# Patient Record
Sex: MALE | Race: ASIAN | NOT HISPANIC OR LATINO | URBAN - METROPOLITAN AREA
[De-identification: names, ages, dates, MRNs, and addresses within clinical notes are randomized per-mention and may not be internally consistent; named-entity substitution may affect disease eponyms.]

---

## 2021-02-19 ENCOUNTER — TELEPHONE (OUTPATIENT)
Dept: TRANSPLANT | Facility: CLINIC | Age: 66
End: 2021-02-19
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
## 2021-02-22 DIAGNOSIS — Z00.5 TRANSPLANT DONOR EVALUATION: Primary | ICD-10-CM

## 2021-04-01 ENCOUNTER — ALLIED HEALTH/NURSE VISIT (OUTPATIENT)
Dept: NURSING | Facility: CLINIC | Age: 66
End: 2021-04-01
Payer: COMMERCIAL

## 2021-04-01 VITALS
DIASTOLIC BLOOD PRESSURE: 72 MMHG | WEIGHT: 166 LBS | TEMPERATURE: 97.7 F | SYSTOLIC BLOOD PRESSURE: 106 MMHG | HEART RATE: 59 BPM | BODY MASS INDEX: 25.16 KG/M2 | HEIGHT: 68 IN

## 2021-04-01 DIAGNOSIS — Z00.5 TRANSPLANT DONOR EVALUATION: ICD-10-CM

## 2021-04-01 DIAGNOSIS — Z52.6 LIVER DONOR: Primary | ICD-10-CM

## 2021-04-01 LAB
ABO + RH BLD: NORMAL
ABO + RH BLD: NORMAL
ALBUMIN UR-MCNC: NEGATIVE MG/DL
APPEARANCE UR: CLEAR
BILIRUB UR QL STRIP: NEGATIVE
COLOR UR AUTO: YELLOW
CREAT SERPL-MCNC: 1.02 MG/DL (ref 0.66–1.25)
CREAT UR-MCNC: 78 MG/DL
GFR SERPL CREATININE-BSD FRML MDRD: 77 ML/MIN/{1.73_M2}
GLUCOSE SERPL-MCNC: 96 MG/DL (ref 70–99)
GLUCOSE UR STRIP-MCNC: NEGATIVE MG/DL
HGB BLD-MCNC: 16.3 G/DL (ref 13.3–17.7)
HGB UR QL STRIP: NEGATIVE
KETONES UR STRIP-MCNC: NEGATIVE MG/DL
LEUKOCYTE ESTERASE UR QL STRIP: NEGATIVE
MICROALBUMIN UR-MCNC: <5 MG/L
MICROALBUMIN/CREAT UR: NORMAL MG/G CR (ref 0–17)
NITRATE UR QL: NEGATIVE
NON-SQ EPI CELLS #/AREA URNS LPF: NORMAL /LPF
PH UR STRIP: 7 PH (ref 5–7)
PROT UR-MCNC: 0.11 G/L
PROT/CREAT 24H UR: 0.14 G/G CR (ref 0–0.2)
RBC #/AREA URNS AUTO: NORMAL /HPF
SOURCE: NORMAL
SP GR UR STRIP: 1.02 (ref 1–1.03)
SPECIMEN EXP DATE BLD: NORMAL
UROBILINOGEN UR STRIP-ACNC: 0.2 EU/DL (ref 0.2–1)
WBC #/AREA URNS AUTO: NORMAL /HPF

## 2021-04-01 PROCEDURE — 86900 BLOOD TYPING SEROLOGIC ABO: CPT | Performed by: SURGERY

## 2021-04-01 PROCEDURE — 85018 HEMOGLOBIN: CPT | Performed by: SURGERY

## 2021-04-01 PROCEDURE — 82947 ASSAY GLUCOSE BLOOD QUANT: CPT | Performed by: SURGERY

## 2021-04-01 PROCEDURE — 36415 COLL VENOUS BLD VENIPUNCTURE: CPT | Performed by: SURGERY

## 2021-04-01 PROCEDURE — 99207 PR NO CHARGE NURSE ONLY: CPT

## 2021-04-01 PROCEDURE — 82043 UR ALBUMIN QUANTITATIVE: CPT | Performed by: SURGERY

## 2021-04-01 PROCEDURE — 82565 ASSAY OF CREATININE: CPT | Performed by: SURGERY

## 2021-04-01 PROCEDURE — 84156 ASSAY OF PROTEIN URINE: CPT | Performed by: SURGERY

## 2021-04-01 PROCEDURE — 81001 URINALYSIS AUTO W/SCOPE: CPT | Performed by: SURGERY

## 2021-04-01 RX ORDER — CLOBETASOL PROPIONATE 0.5 MG/G
CREAM TOPICAL
COMMUNITY
Start: 2019-06-03

## 2021-04-01 RX ORDER — ATORVASTATIN CALCIUM 10 MG/1
10 TABLET, FILM COATED ORAL
COMMUNITY
Start: 2021-01-20

## 2021-04-01 ASSESSMENT — MIFFLIN-ST. JEOR: SCORE: 1517.47

## 2021-06-03 DIAGNOSIS — Z00.5 TRANSPLANT DONOR EVALUATION: Primary | ICD-10-CM

## 2021-06-10 DIAGNOSIS — Z00.5 TRANSPLANT DONOR EVALUATION: ICD-10-CM

## 2021-06-21 LAB
A*: NORMAL
A*LOCUS SEROLOGIC EQUIVALENT: 11
A*LOCUS: NORMAL
A*SEROLOGIC EQUIVALENT: 24
AB TEST METHOD: NORMAL
B*: NORMAL
B*LOCUS SEROLOGIC EQUIVALENT: 75
B*LOCUS: NORMAL
B*SEROLOGIC EQUIVALENT: 35
BW-1: NORMAL
C*: NORMAL
C*LOCUS SEROLOGIC EQUIVALENT: 4
C*LOCUS: NORMAL
C*SEROLOGIC EQUIVALENT: 8
DPA1*: NORMAL
DPA1*LOCUS: NORMAL
DPB1*: NORMAL
DPB1*LOCUS: NORMAL
DQA1*: NORMAL
DQA1*LOCUS: NORMAL
DQB1*: NORMAL
DQB1*LOCUS SEROLOGIC EQUIVALENT: 7
DQB1*LOCUS: NORMAL
DQB1*SEROLOGIC EQUIVALENT: 6
DRB1*: NORMAL
DRB1*LOCUS SEROLOGIC EQUIVALENT: 12
DRB1*LOCUS: NORMAL
DRB1*SEROLOGIC EQUIVALENT: 15
DRB3*LOCUS SEROLOGIC EQUIVALENT: 52
DRB3*LOCUS: NORMAL
DRB5*: NORMAL
DRB5*SEROLOGIC EQUIVALENT: 51
DRSSO TEST METHOD: NORMAL

## 2021-10-18 ENCOUNTER — TELEPHONE (OUTPATIENT)
Dept: TRANSPLANT | Facility: CLINIC | Age: 66
End: 2021-10-18

## 2021-10-18 NOTE — TELEPHONE ENCOUNTER
José Miguel had LM asking for next steps. Recip coordinator also said go forward with donors. From [facility] meeting in 9/21 a new PRA sample was needed to compare with donors. Recip coordinator is reasking [facility]'s dialysis center to send a new PRA sample.Unable to speak with José Miguel at this time so left this message with him.

## 2021-11-04 ENCOUNTER — DOCUMENTATION ONLY (OUTPATIENT)
Dept: TRANSPLANT | Facility: CLINIC | Age: 66
End: 2021-11-04

## 2021-11-04 ENCOUNTER — TELEPHONE (OUTPATIENT)
Dept: TRANSPLANT | Facility: CLINIC | Age: 66
End: 2021-11-04

## 2021-11-04 NOTE — TELEPHONE ENCOUNTER
"Reviewed XM at [facility] Meeting with new PRA sample from [facility].Per Dr Robison d/t high antibody levels José Miguel cannot be a direct donor.Now spoke with José Miguel and he understood and is still interested in PEP. Stated he wanted to come forward to be tested.Discussed \"a little\"with his sons and he wants to go forward. Will now sched SHERRIE call.  "

## 2021-11-08 ENCOUNTER — TELEPHONE (OUTPATIENT)
Dept: TRANSPLANT | Facility: CLINIC | Age: 66
End: 2021-11-08
Payer: COMMERCIAL

## 2021-11-08 NOTE — TELEPHONE ENCOUNTER
I left a message for José Miguel.  I asked him to call the main social work number back at [phone] option 3 to reschedule his initial SHERRIE call.    THALIA Montoya, [facility]  Donor  and Independent Living Donor Advocate  [facility], [facility]  Pager:  [phone]  Direct:  [phone] Cell   E-Mail:  [email or web address]

## 2021-11-09 ENCOUNTER — TELEPHONE (OUTPATIENT)
Dept: TRANSPLANT | Facility: CLINIC | Age: 66
End: 2021-11-09

## 2021-11-09 ENCOUNTER — TELEPHONE (OUTPATIENT)
Dept: TRANSPLANT | Facility: CLINIC | Age: 66
End: 2021-11-09
Payer: COMMERCIAL

## 2021-11-10 ENCOUNTER — TELEPHONE (OUTPATIENT)
Dept: TRANSPLANT | Facility: CLINIC | Age: 66
End: 2021-11-10
Payer: COMMERCIAL

## 2021-11-10 NOTE — TELEPHONE ENCOUNTER
Initial Independent Living Donor Advocate contact made with potential donor today.  I introduced myself and my role during the donation process, includin.  SHERRIE ROLE   The federal government requires that all licensed transplant centers provide the living donor with an Independent Living Donor Advocate (SHERRIE).  I do not meet recipients or attend meetings that discuss their care or decision to transplant them. My role is separate to avoid any conflict of interest.  My role is to ensure:  1) your rights are protected;  2) you get all the information you need from the transplant team to make a fully informed decision whether to donate;   3) that living donation is in your best interest.   4) that you have the right to decide NOT to go forward with living donation at any time during this process.  I am available to you throughout the workup, during surgery phase and follow-up at home.   2. WORKUP & PRIVACY     Your identity and workup are not shared with the recipient at any time.     There is a medical donor workup that consists of testing to determine if you are healthy enough to donate.  Workup tests include many blood draws, urine collection/ (kidney function testing), chest x-ray, EKG, CT scan. As you complete each step then you may move on to the next.  Workup can take as little or as long as you need and you can stop the process at any time.     Transplant is a treatment option, not a cure. A kidney from a living kidney donor can last 12-14 years.  Other treatment options are  donation and two types of dialysis.     This is major surgery and your estimated hospital stay is approximately 1-2 nights.  After surgery, there are driving and lifting restrictions - no driving for two weeks and no lifting over ten pounds for 6 - 8 weeks.  Donors are routinely off from work for 4 - 6 weeks after surgery, and potentially longer if they have a physical job.       If you anticipate lost wages due to donation,  donor wage reimbursement options may be available to you and will be reviewed with you during the evaluation process.      The recipient's insurance covers the medical expenses related to the donor evaluation and surgery.  However, it is important for you to carry your own health insurance to address any medical issues that are found and are NOT related to living donation.  3.  QUESTIONS  Have you received a packet from the transplant department?     Questions?    Have you discussed with anyone your potential decision to donate?   Yes.  Is anyone pressuring or coercing you to donate? No.  Have you discussed any financial arrangements with recipient around donating a kidney? No.  Are you aware that you can confidentially opt out at any time, up to and including day of donation? Yes.  At this time, would you like to proceed with the medical evaluation to see if you can be a kidney donor?  Yes.    If yes, the donor coordinator will be reaching out to you with next steps.     You can reach me or someone else on the SHERRIE team by calling [phone] Option 3.    SHERRIE NOTES: José Miguel is interested in advanced donation where his wife would receive her transplant first and then he would donate once she has recovered.  2 p.m. on 11/23 is his donor nurse coodinator call with Jaelyn East RN    Duration of call 35

## 2021-11-18 ENCOUNTER — TELEPHONE (OUTPATIENT)
Dept: TRANSPLANT | Facility: CLINIC | Age: 66
End: 2021-11-18
Payer: COMMERCIAL

## 2021-11-18 NOTE — TELEPHONE ENCOUNTER
Contacted Dennis Gonzales to introduce myself and my role, review of medical/surgical/family history and next steps.     Dennis Gonzales  is aware He can stop donor evaluation at any time.    Have you ever been positive for COVID 19? no    Have you received the COVID vaccination? yes If yes, when and what brand? Moderna     Dennis Gonzales is a 66 year old male  ABO O that would like to learn more about donation to his wife.     Concerns from medical/surgical/family history: none currently. Family history of HTN and Diabetes in both parents.    Reviewed any history of travel in endemic areas:   Strongyloides- [place], [place] and [place].  Trypanosoma cruzi ([place])- [place]  West Nile Virus- [place], [place], [place], [place] and [place].     Per our Phase 1 algorithm, does not meet criteria to do preliminary testing.    Reviewed preliminary lab tests.     Reviewed evaluation testing: Covid PCR, Iohexol, Lab work, CXR, EKG, Provider visits and functions, CT Angiogram.     Reviewed operations of selection committee and angio review meetings and the need for multidisciplinary input.     Reviewed NKR listing and transfer of care to [facility] team if approved. Provided Dennis with NKR website to review.     Briefly went over options if approved of NDD, SVP and FVP.     Dennis would like to proceed to evaluation on 12/9/21 if possible with Iohexol on 12/9/21 and COVID PCR prior to Iohexol.     Confirmed that Dennis reviewed Informed consent document and all questions answered.  Reviewed that they will receive Docusign to obtain electronic signature for the following: Informed consent, SRTR data, MARCUS for medical information, Auth for Electronic communication and will need their signed consent back before proceeding with evaluation.      Encouraged sign up for MyChart and confirmed [facility] sign up.    Verified recipient status if not NDD.    Donor timeline: TBD     Will send orders to scheduling team to  set up for evaluation testing.

## 2021-11-19 DIAGNOSIS — Z00.5 TRANSPLANT DONOR EVALUATION: Primary | ICD-10-CM

## 2021-11-19 RX ORDER — METHYLPREDNISOLONE SODIUM SUCCINATE 125 MG/2ML
125 INJECTION, POWDER, LYOPHILIZED, FOR SOLUTION INTRAMUSCULAR; INTRAVENOUS
Status: CANCELLED
Start: 2021-12-09

## 2021-11-19 RX ORDER — NALOXONE HYDROCHLORIDE 0.4 MG/ML
0.2 INJECTION, SOLUTION INTRAMUSCULAR; INTRAVENOUS; SUBCUTANEOUS
Status: CANCELLED | OUTPATIENT
Start: 2021-12-09

## 2021-11-19 RX ORDER — ALBUTEROL SULFATE 0.83 MG/ML
2.5 SOLUTION RESPIRATORY (INHALATION)
Status: CANCELLED | OUTPATIENT
Start: 2021-12-09

## 2021-11-19 RX ORDER — EPINEPHRINE 1 MG/ML
0.3 INJECTION, SOLUTION, CONCENTRATE INTRAVENOUS EVERY 5 MIN PRN
Status: CANCELLED | OUTPATIENT
Start: 2021-12-09

## 2021-11-19 RX ORDER — MEPERIDINE HYDROCHLORIDE 25 MG/ML
25 INJECTION INTRAMUSCULAR; INTRAVENOUS; SUBCUTANEOUS EVERY 30 MIN PRN
Status: CANCELLED | OUTPATIENT
Start: 2021-12-09

## 2021-11-19 RX ORDER — ALBUTEROL SULFATE 90 UG/1
1-2 AEROSOL, METERED RESPIRATORY (INHALATION)
Status: CANCELLED
Start: 2021-12-09

## 2021-11-19 RX ORDER — DIPHENHYDRAMINE HYDROCHLORIDE 50 MG/ML
50 INJECTION INTRAMUSCULAR; INTRAVENOUS
Status: CANCELLED
Start: 2021-12-09

## 2021-12-05 ENCOUNTER — HEALTH MAINTENANCE LETTER (OUTPATIENT)
Age: 66
End: 2021-12-05

## 2021-12-09 ENCOUNTER — OFFICE VISIT (OUTPATIENT)
Dept: TRANSPLANT | Facility: CLINIC | Age: 66
End: 2021-12-09

## 2021-12-09 ENCOUNTER — OFFICE VISIT (OUTPATIENT)
Dept: TRANSPLANT | Facility: CLINIC | Age: 66
End: 2021-12-09
Attending: SURGERY

## 2021-12-09 ENCOUNTER — OFFICE VISIT (OUTPATIENT)
Dept: INFUSION THERAPY | Facility: CLINIC | Age: 66
End: 2021-12-09
Attending: INTERNAL MEDICINE
Payer: COMMERCIAL

## 2021-12-09 ENCOUNTER — ALLIED HEALTH/NURSE VISIT (OUTPATIENT)
Dept: TRANSPLANT | Facility: CLINIC | Age: 66
End: 2021-12-09

## 2021-12-09 ENCOUNTER — LAB (OUTPATIENT)
Dept: LAB | Facility: CLINIC | Age: 66
End: 2021-12-09
Attending: INTERNAL MEDICINE

## 2021-12-09 ENCOUNTER — ANCILLARY PROCEDURE (OUTPATIENT)
Dept: GENERAL RADIOLOGY | Facility: CLINIC | Age: 66
End: 2021-12-09
Attending: INTERNAL MEDICINE
Payer: COMMERCIAL

## 2021-12-09 ENCOUNTER — APPOINTMENT (OUTPATIENT)
Dept: TRANSPLANT | Facility: CLINIC | Age: 66
End: 2021-12-09
Attending: TRANSPLANT SURGERY

## 2021-12-09 ENCOUNTER — ALLIED HEALTH/NURSE VISIT (OUTPATIENT)
Dept: TRANSPLANT | Facility: CLINIC | Age: 66
End: 2021-12-09
Attending: TRANSPLANT SURGERY

## 2021-12-09 ENCOUNTER — ANCILLARY PROCEDURE (OUTPATIENT)
Dept: CT IMAGING | Facility: CLINIC | Age: 66
End: 2021-12-09
Attending: INTERNAL MEDICINE
Payer: COMMERCIAL

## 2021-12-09 VITALS
DIASTOLIC BLOOD PRESSURE: 75 MMHG | SYSTOLIC BLOOD PRESSURE: 114 MMHG | OXYGEN SATURATION: 97 % | BODY MASS INDEX: 25.53 KG/M2 | WEIGHT: 167.9 LBS | RESPIRATION RATE: 16 BRPM | TEMPERATURE: 97.9 F | HEART RATE: 62 BPM

## 2021-12-09 VITALS — HEIGHT: 68 IN | HEART RATE: 65 BPM | BODY MASS INDEX: 25.31 KG/M2 | OXYGEN SATURATION: 99 % | WEIGHT: 167 LBS

## 2021-12-09 DIAGNOSIS — Z00.5 TRANSPLANT DONOR EVALUATION: ICD-10-CM

## 2021-12-09 DIAGNOSIS — Z00.5 TRANSPLANT DONOR EVALUATION: Primary | ICD-10-CM

## 2021-12-09 LAB
ABO/RH(D): NORMAL
ALBUMIN SERPL-MCNC: 4.2 G/DL (ref 3.4–5)
ALBUMIN UR-MCNC: NEGATIVE MG/DL
ALP SERPL-CCNC: 91 U/L (ref 40–150)
ALT SERPL W P-5'-P-CCNC: 39 U/L (ref 0–70)
ANION GAP SERPL CALCULATED.3IONS-SCNC: 5 MMOL/L (ref 3–14)
ANTIBODY SCREEN: NEGATIVE
APPEARANCE UR: CLEAR
APTT PPP: 32 SECONDS (ref 22–38)
AST SERPL W P-5'-P-CCNC: 26 U/L (ref 0–45)
ATRIAL RATE - MUSE: 62 BPM
BILIRUB SERPL-MCNC: 1.6 MG/DL (ref 0.2–1.3)
BILIRUB UR QL STRIP: NEGATIVE
BUN SERPL-MCNC: 20 MG/DL (ref 7–30)
CALCIUM SERPL-MCNC: 9.2 MG/DL (ref 8.5–10.1)
CHLORIDE BLD-SCNC: 106 MMOL/L (ref 94–109)
CHOLEST SERPL-MCNC: 179 MG/DL
CMV IGG SERPL IA-ACNC: 4.1 U/ML
CMV IGG SERPL IA-ACNC: ABNORMAL
CO2 SERPL-SCNC: 29 MMOL/L (ref 20–32)
COLOR UR AUTO: YELLOW
CREAT SERPL-MCNC: 1.01 MG/DL (ref 0.66–1.25)
CREAT UR-MCNC: 132 MG/DL
CREAT UR-MCNC: 132 MG/DL
DIASTOLIC BLOOD PRESSURE - MUSE: NORMAL MMHG
EBV VCA IGG SER IA-ACNC: 21.1 U/ML
EBV VCA IGG SER IA-ACNC: ABNORMAL
EBV VCA IGM SER IA-ACNC: <10 U/ML
EBV VCA IGM SER IA-ACNC: NORMAL
ERYTHROCYTE [DISTWIDTH] IN BLOOD BY AUTOMATED COUNT: 12.3 % (ref 10–15)
FASTING STATUS PATIENT QL REPORTED: YES
GFR SERPL CREATININE-BSD FRML MDRD: 78 ML/MIN/1.73M2
GLUCOSE BLD-MCNC: 93 MG/DL (ref 70–99)
GLUCOSE UR STRIP-MCNC: NEGATIVE MG/DL
HBA1C MFR BLD: 5.5 % (ref 0–5.6)
HBV CORE AB SERPL QL IA: NONREACTIVE
HBV SURFACE AB SERPL IA-ACNC: 371.8 M[IU]/ML
HBV SURFACE AG SERPL QL IA: NONREACTIVE
HCT VFR BLD AUTO: 50.3 % (ref 40–53)
HCV AB SERPL QL IA: NONREACTIVE
HDLC SERPL-MCNC: 72 MG/DL
HGB BLD-MCNC: 17.1 G/DL (ref 13.3–17.7)
HGB UR QL STRIP: NEGATIVE
HIV 1+2 AB+HIV1 P24 AG SERPL QL IA: NONREACTIVE
INR PPP: 0.96 (ref 0.85–1.15)
INTERPRETATION ECG - MUSE: NORMAL
KETONES UR STRIP-MCNC: NEGATIVE MG/DL
LDLC SERPL CALC-MCNC: 83 MG/DL
LEUKOCYTE ESTERASE UR QL STRIP: NEGATIVE
MCH RBC QN AUTO: 30.3 PG (ref 26.5–33)
MCHC RBC AUTO-ENTMCNC: 34 G/DL (ref 31.5–36.5)
MCV RBC AUTO: 89 FL (ref 78–100)
MICROALBUMIN UR-MCNC: 5 MG/L
MICROALBUMIN/CREAT UR: 3.79 MG/G CR (ref 0–17)
MUCOUS THREADS #/AREA URNS LPF: PRESENT /LPF
NITRATE UR QL: NEGATIVE
NONHDLC SERPL-MCNC: 107 MG/DL
P AXIS - MUSE: 75 DEGREES
PH UR STRIP: 6 [PH] (ref 5–7)
PHOSPHATE SERPL-MCNC: 3.3 MG/DL (ref 2.5–4.5)
PLATELET # BLD AUTO: 188 10E3/UL (ref 150–450)
POTASSIUM BLD-SCNC: 3.9 MMOL/L (ref 3.4–5.3)
PR INTERVAL - MUSE: 162 MS
PROT SERPL-MCNC: 8.3 G/DL (ref 6.8–8.8)
PROT UR-MCNC: 0.08 G/L
PROT/CREAT 24H UR: 0.06 G/G CR (ref 0–0.2)
PSA SERPL-MCNC: 0.75 UG/L (ref 0–4)
QRS DURATION - MUSE: 90 MS
QT - MUSE: 424 MS
QTC - MUSE: 430 MS
R AXIS - MUSE: 64 DEGREES
RBC # BLD AUTO: 5.64 10E6/UL (ref 4.4–5.9)
RBC URINE: <1 /HPF
SODIUM SERPL-SCNC: 140 MMOL/L (ref 133–144)
SP GR UR STRIP: 1.01 (ref 1–1.03)
SPECIMEN EXPIRATION DATE: NORMAL
SYSTOLIC BLOOD PRESSURE - MUSE: NORMAL MMHG
T AXIS - MUSE: 47 DEGREES
T PALLIDUM AB SER QL: NONREACTIVE
TRIGL SERPL-MCNC: 118 MG/DL
URATE SERPL-MCNC: 6.4 MG/DL (ref 3.5–7.2)
UROBILINOGEN UR STRIP-MCNC: NORMAL MG/DL
VENTRICULAR RATE- MUSE: 62 BPM
WBC # BLD AUTO: 6.5 10E3/UL (ref 4–11)
WBC URINE: <1 /HPF

## 2021-12-09 PROCEDURE — 86644 CMV ANTIBODY: CPT

## 2021-12-09 PROCEDURE — 84100 ASSAY OF PHOSPHORUS: CPT

## 2021-12-09 PROCEDURE — 71046 X-RAY EXAM CHEST 2 VIEWS: CPT | Mod: GC | Performed by: RADIOLOGY

## 2021-12-09 PROCEDURE — 36415 COLL VENOUS BLD VENIPUNCTURE: CPT | Performed by: SURGERY

## 2021-12-09 PROCEDURE — 85027 COMPLETE CBC AUTOMATED: CPT

## 2021-12-09 PROCEDURE — 85730 THROMBOPLASTIN TIME PARTIAL: CPT

## 2021-12-09 PROCEDURE — 86753 PROTOZOA ANTIBODY NOS: CPT

## 2021-12-09 PROCEDURE — 82043 UR ALBUMIN QUANTITATIVE: CPT

## 2021-12-09 PROCEDURE — 86706 HEP B SURFACE ANTIBODY: CPT

## 2021-12-09 PROCEDURE — 84550 ASSAY OF BLOOD/URIC ACID: CPT

## 2021-12-09 PROCEDURE — 86900 BLOOD TYPING SEROLOGIC ABO: CPT

## 2021-12-09 PROCEDURE — 99203 OFFICE O/P NEW LOW 30 MIN: CPT | Performed by: SURGERY

## 2021-12-09 PROCEDURE — 250N000011 HC RX IP 250 OP 636: Performed by: INTERNAL MEDICINE

## 2021-12-09 PROCEDURE — 36415 COLL VENOUS BLD VENIPUNCTURE: CPT

## 2021-12-09 PROCEDURE — 82542 COL CHROMOTOGRAPHY QUAL/QUAN: CPT | Performed by: SURGERY

## 2021-12-09 PROCEDURE — G0103 PSA SCREENING: HCPCS

## 2021-12-09 PROCEDURE — 80053 COMPREHEN METABOLIC PANEL: CPT

## 2021-12-09 PROCEDURE — 86682 HELMINTH ANTIBODY: CPT

## 2021-12-09 PROCEDURE — 74175 CTA ABDOMEN W/CONTRAST: CPT | Mod: GC | Performed by: RADIOLOGY

## 2021-12-09 PROCEDURE — 86780 TREPONEMA PALLIDUM: CPT

## 2021-12-09 PROCEDURE — 86704 HEP B CORE ANTIBODY TOTAL: CPT

## 2021-12-09 PROCEDURE — 85610 PROTHROMBIN TIME: CPT

## 2021-12-09 PROCEDURE — 81001 URINALYSIS AUTO W/SCOPE: CPT

## 2021-12-09 PROCEDURE — 87340 HEPATITIS B SURFACE AG IA: CPT

## 2021-12-09 PROCEDURE — 86803 HEPATITIS C AB TEST: CPT

## 2021-12-09 PROCEDURE — 84156 ASSAY OF PROTEIN URINE: CPT

## 2021-12-09 PROCEDURE — 86481 TB AG RESPONSE T-CELL SUSP: CPT

## 2021-12-09 PROCEDURE — 86665 EPSTEIN-BARR CAPSID VCA: CPT

## 2021-12-09 PROCEDURE — 86789 WEST NILE VIRUS ANTIBODY: CPT

## 2021-12-09 PROCEDURE — 83036 HEMOGLOBIN GLYCOSYLATED A1C: CPT

## 2021-12-09 PROCEDURE — 80061 LIPID PANEL: CPT

## 2021-12-09 RX ORDER — ALBUTEROL SULFATE 0.83 MG/ML
2.5 SOLUTION RESPIRATORY (INHALATION)
Status: CANCELLED | OUTPATIENT
Start: 2021-12-09

## 2021-12-09 RX ORDER — MEPERIDINE HYDROCHLORIDE 25 MG/ML
25 INJECTION INTRAMUSCULAR; INTRAVENOUS; SUBCUTANEOUS EVERY 30 MIN PRN
Status: CANCELLED | OUTPATIENT
Start: 2021-12-09

## 2021-12-09 RX ORDER — EPINEPHRINE 1 MG/ML
0.3 INJECTION, SOLUTION INTRAMUSCULAR; SUBCUTANEOUS EVERY 5 MIN PRN
Status: CANCELLED | OUTPATIENT
Start: 2021-12-09

## 2021-12-09 RX ORDER — NALOXONE HYDROCHLORIDE 0.4 MG/ML
0.2 INJECTION, SOLUTION INTRAMUSCULAR; INTRAVENOUS; SUBCUTANEOUS
Status: CANCELLED | OUTPATIENT
Start: 2021-12-09

## 2021-12-09 RX ORDER — DIPHENHYDRAMINE HYDROCHLORIDE 50 MG/ML
50 INJECTION INTRAMUSCULAR; INTRAVENOUS
Status: CANCELLED
Start: 2021-12-09

## 2021-12-09 RX ORDER — ALBUTEROL SULFATE 90 UG/1
1-2 AEROSOL, METERED RESPIRATORY (INHALATION)
Status: CANCELLED
Start: 2021-12-09

## 2021-12-09 RX ORDER — METHYLPREDNISOLONE SODIUM SUCCINATE 125 MG/2ML
125 INJECTION, POWDER, LYOPHILIZED, FOR SOLUTION INTRAMUSCULAR; INTRAVENOUS
Status: CANCELLED
Start: 2021-12-09

## 2021-12-09 RX ORDER — IOPAMIDOL 755 MG/ML
100 INJECTION, SOLUTION INTRAVASCULAR ONCE
Status: COMPLETED | OUTPATIENT
Start: 2021-12-09 | End: 2021-12-09

## 2021-12-09 RX ADMIN — IOPAMIDOL 100 ML: 755 INJECTION, SOLUTION INTRAVASCULAR at 13:44

## 2021-12-09 RX ADMIN — IOHEXOL 5 ML: 300 INJECTION, SOLUTION INTRAVENOUS at 09:08

## 2021-12-09 ASSESSMENT — MIFFLIN-ST. JEOR: SCORE: 1517.01

## 2021-12-09 ASSESSMENT — PAIN SCALES - GENERAL: PAINLEVEL: NO PAIN (0)

## 2021-12-09 NOTE — PROGRESS NOTES
Living Kidney Donor Consent per OPTN Policy 14.3.A for Independent Living Donor Advocate (SHERRIE)    Organ Type: Unrelated Kidney Donor (spouse)    Presenting Information:  Dennis Gonzales presents to [facility] to complete a living donor evaluation since angel is interested in becoming a kidney donor for his wife, Fatemeh Gonzales.  Mr. GONZALES is a 66 year old  American male who was neatly dressed and groomed.  His mood was euthymic.  Thought process logical and goal directed.  He came to the transplant center alone today.  He was pleasant and forthcoming with sharing information.    Written assurance has been obtained from the potential donor that he/she:   Is willing to donate  Is free from inducement and coercion  Has been informed that the he/she may decline to donate at any time  Has been informed that transplant centers must:   A) Offer donors an opportunity to discontinue the donor consent or evaluation process in a way that is protected and confidential  B) Provide an independent living donor advocate (SHERRIE) to assist the potential donor during this process    The following was presented to the potential donor in a language in which the potential donor is able to engage in meaningful dialogue:   Education and instruction about all phases of the living donation process including:   Consent  Medical and psychosocial evaluation  Information about the surgical procedure  Pre and post operative care  Benefits of post operative follow up  Disclosure that the recovery hospital will take all reasonable precautions to provide confidentiality for the donor/recipient  Disclosure that it is a federal crime for any person to knowingly acquire, obtain or otherwise transfer any human organ for valuable consideration  Disclosure that the recovery hospital must provide an independent living donor advocate (SHERRIE)  Disclosure that health information obtained  during the evaluation is subject to the same regulations as all records and could reveal conditions that must be reported to local, state, or federal public health authorities  Disclosure that the [facility] is required to report living donor follow up information at 6 months, 1 year, and 2 years, and that the potential donor must commit to post operative follow up testing coordinated by the [facility]    Disclosure has been provided that these risks may be transient or permanent & include but are not limited to:  Potential psychosocial risks:  Problems with body image  Post-surgery depression or anxiety  Feelings of emotional distress or bereavement if recipient experiences any recurrent disease or in the event of the recipient s death  Impact of donation on the donor s lifestyle, such as limited ability to exercise in the short term post operative recovery period, no driving for the first 2 weeks post op or until the donor is no longer needing pain medications that impair the ability to drive.      Potential financial impacts:  Personal expenses of travel, housing, , lost wages related to donation might not be reimbursed. However, resources may be available to defray some donation-related expenses.   Need for life-long follow up at the donor s expense  Loss of employment or income  Negative impact on the ability to obtain future employment  Negative impact on the ability to obtain, maintain, or afford health, disability, and life insurance  Future health problems experienced by living donors following donation may not be covered by the recipient s insurance      PREPARATION FOR DONATION, RECOVERY, AND POTENTIAL SHORT-LONG-TERM OUTCOMES:  Understanding of the Living Donation Process:  We discussed the role of Independent Living Donor Advocate.  Short and long term medical and psychosocial risks to both, donor and recipient were reviewed and he is capable of understanding the risks.  Post  surgical restrictions (2 weeks no driving, 6 weeks no lifting over 10 lbs) were reviewed and he appears capable of adhering to the post surgical requirements. The need for a caregiver was discussed and he was strongly encouraged to ask for help with caregiving.  He has two sons who live out of state and are successful and busy.  I encouraged him to ask for their help post donation .  The risk of poor psychosocial outcome including problems with body image, post-surgery depression or anxiety, or feelings of emotional distress or bereavement if recipient experiences any recurrent disease, poor outcome or death was reviewed.  Additionally, potential financial implications, including the risk of having difficulty obtaining health care insurance, life insurance, disability insurance, or long term care insurance were reviewed, as were available donor grants to assist with donor related expenses.      We also discussed some unique issues that arise with paired kidney donation, which include the uncertainty of the timing and the importance of having a employment situation and support system that is able to provide sustained support and flexibility.    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  He does push up every morning and runs, to stay healthy.  He speaks highly of his sons who live out of state.  I did encourage him to seek out their support at time of donation.    He appears capable of understanding this information and making an informed medical decision.    Impressions/Recommendations:  Mr. Gonzales appears highly motivated to donate a kidney to his wife, Fatemeh GONZALES.  He denies pressure, inducement or coercion.  He is aware that he can chose to decline donation at any time.  He is retired and has a hard time identifying a support system, but does state his sons are supportive.  As the SHERRIE, I see no contraindication to donation.  If contraindications are identified, those will be considered and  followed up on.  He has my contact information and knows that I am available though all phases of the donation process.    Contact Information:  THALIA TURCIOS, [facility]   Independent Donor Advocate  [facility]  Phone - [phone]  Pager - [phone]  [email or web address]      Time Spent: 30 minutes

## 2021-12-09 NOTE — LETTER
2021         RE: Dennis Gonzales   [place] [facility] [id number]        Dear Colleague,    Thank you for referring your patient, Dennis Gonzales, to the [facility]. Please see a copy of my visit note below.    Donor Iohexol test    Dennis Gonzales presents today to [facility] for a Donor Iohexol test.      Progress note:  ID verified by name and .     The following information was verified with the patient:  Female Patients is there any possibility of being pregnant n/a  Is there a history of allergy (skin rash, swelling, ect) to:   A.  Iodine (except skin reactions to betadine): No   B. Intravenous radio-contrast agents: No   C. Seafood No     present during visit today: Not Applicable.    R.N. provided patient with educational handout regarding timed test. Yes    24 g piv placed in left ac and Iohexol administered over 2 minutes.  Positive blood return verified before and after injection. piv removed.  20 gauge PIV placed in left arm by lab  for blood draws and CT this afternoon.    Medication administered:  Iohexol (Omnipaque 300mg iodine/ml concentration) 5 mls.    Start time: 908  Stop time: 910    Drug Waste Record    Drug Name: iohexol  Dose: 5ml  Route administered: IV  NDC #: 0407-1413-10  Amount of waste(mL):5ml  Reason for waste: Single use vial    Administrations This Visit     iohexol (OMNIPAQUE 300) injection 5 mL     Admin Date  2021 Action  Given Dose  5 mL Route  Intravenous Administered By  Steffi Pool RN                    Evaluation nurse in transplant to draw labs at 2 and 4 hours post iohexol administration.  Patient given a slip with the times to get labs drawn and verbalized understanding of the plan.    Patient tolerated the procedure:  Yes    After the infusion patient was discharged to the next appointment.    Steffi Pool RN      [facility]

## 2021-12-09 NOTE — PROGRESS NOTES
67 yo M interested in donation to wife- paired exchange due to antibodies. Understands and wants to proceed with paired exchange if approved. 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 Active as a runner, runs 5K daily and also walks and does push ups every morning. Uses elliptical during bad weather.     Medical history: prediabetes some years ago, but controlled with diet and exercise. Fatty liver (remote), hasn't drank alcohol for several years    Past head trauma from an accident (very remote). Has a slight left facial droop from this but no other sequelae.     Does have some neck pain- saw physical therapist for this. Not using medication or injection, but has some pain with turning his neck in certain ways. Counseled about neck pain after positioning.     abd soft, flat, no hernia.     Transplant Surgery Consult Note    Medical record number: [id number]  YOB: 1955,   Consult requested by the patient for evaluation of kidney donation candidacy.    Assessment and Recommendations: Mr. Gonzales appears to be a good candidate for kidney donation at this point in the evaluation. The following issues will need to be addressed prior to formal review:    Prediabetes: reportedly diagnosed some years ago, but current A1c 5.5 with diet and increased exercise. Counseled to continue healthy habits.    Elevated total bilirubin/history of fatty liver disease: no evidence of portal HTN or synthetic dysfunction. Will eval liver appearance on CTA. If concerned, can proceed with RUQ US and hepatology consult.       The majority of our visit today was spent in counseling regarding the medical and surgical risks of kidney donation, the typical timur-and post-operative experience and recovery/return to work pattern.  We also talked about post-op visits and longer term  health care maintenance, as well as the implications of having one remaining kidney. This discussion included, but was not limited to rates of complications such as bleeding, infection, need for transfusion, reoperation, other organ injury, future bowel obstruction, incisional hernia, port site pain, varicocele, venous thrombosis, pulmonary embolism, renal failure, and death (3 per 10,000). The patient understands that if end stage renal failure happens that dialysis or transplant would be required. At the conclusion of the visit, all questions had been answered and Mr. Gonzales's candidacy for donation will be reviewed at our Multidisciplinary Donor Selection Committee. He will stay in contact with the nurse coordinator with any concerns.      Total time: 35 minutes  Counselling time: 30 minutes      Mack Dsouza MD  ---------------------------------------------------------------------------------------------------    HPI: Dennis Gonzales is a 66 year old year old male who presents for a kidney donor evaluation.  Patient would like to donate to his wife. They are not compatible for direct donation due to unacceptable antigens and will need to proceed with paired exchange. She is doing well on dialysis twice weekly, and has been able to continue working from home.     He is also in good health. His only medication is a low dose statin. 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 He is active with running and walking 5K a few times per week and using the elliptical.     He does have some neck pain with turning his head for which he has seen a physical therapist. We discussed positioning and the potential strain on his neck, and he is comfortable proceeding.     Personal history of:   No    Yes  Cancer:    [x]      []             Comment:     Diabetes   []      [x]  Comment:  Reported prediabetes but improved to A1c 5.5 with diet and exercise  Brittnee   [x]      []  Comment:        Hepatitis   [x]      []  Comment:    Tuberculosis   [x]      []  Comment:   Back or neck pain:  []      [x]  Comment:  As above   Kidney stones   [x]      []  Comment:                  Kidney infections  [x]      []  Comment:           Urinary retention  [x]      []            Comment:   Regular NSAID use:  [x]      []            Comment:      Constipation:   [x]      []            Comment:      Moravian  [x]      []            Comment:      Other:    [x]      []            Comment:         Past Medical History:   Diagnosis Date     Gilbert's syndrome      High cholesterol      No past surgical history on file.  Family History   Problem Relation Age of Onset     Diabetes Mother      Hypertension Mother      Diabetes Father      Alzheimer Disease Father      Unknown/Adopted Maternal Grandmother      Unknown/Adopted Maternal Grandfather      Unknown/Adopted Paternal Grandmother      Unknown/Adopted Paternal Grandfather      Food Allergy Son      No Known Problems Son      Social History     Socioeconomic History     Marital status:      Spouse name: Not on file     Number of children: Not on file     Years of education: Not on file     Highest education level: Not on file   Occupational History     Not on file   Tobacco Use     Smoking status: Never Smoker     Smokeless tobacco: Never Used   Substance and Sexual Activity     Alcohol use: Not Currently     Drug use: Never     Sexual activity: Yes     Partners: Female   Other Topics Concern     Not on file   Social History Narrative     Not on file     Social Determinants of Health     Financial Resource Strain: Not on file   Food Insecurity: Not on file   Transportation Needs: Not on file   Physical Activity: Not on file   Stress: Not on file   Social Connections: Not on file   Intimate Partner Violence: Not on file   Housing Stability: Not on file       ROS:   CONSTITUTIONAL:  No fevers or chills  EYES: negative for icterus  ENT:  negative for  hearing loss, tinnitus and sore throat  RESPIRATORY:  negative for cough, sputum, dyspnea  CARDIOVASCULAR:  negative for chest pain  GASTROINTESTINAL:  negative for nausea, vomiting, diarrhea or constipation  GENITOURINARY:  negative for incontinence, dysuria, bladder emptying problems  HEME:  No easy bruising  INTEGUMENT:  negative for rash and pruritus  NEURO:  Negative for headache, seizure disorder    Allergies:   No Known Allergies    Medications:  Prescription Medications as of 12/9/2021       Rx Number Disp Refills Start End Last Dispensed Date Next Fill Date Owning Pharmacy    atorvastatin (LIPITOR) 10 MG tablet    1/20/2021        Sig: Take 10 mg by mouth    Class: Historical    Route: Oral    calcium-vitamin D (OSCAL) 250-125 MG-UNIT per tablet            Class: Historical    clobetasol (TEMOVATE) 0.05 % external cream    6/3/2019        Class: Historical    Route: Topical      Clinic-Administered Medications as of 12/9/2021       Dose Frequency Start End    CT Sodium Chloride (Completed) 100 mL ONCE 12/9/2021 12/9/2021    Admin Instructions: This entry is for use by Radiology to intermittently used as a flush in patients receiving a CT scan.    Route: Intravenous    iohexol (OMNIPAQUE 300) injection 5 mL (Completed) 5 mL ONCE 12/9/2021 12/9/2021    Route: Intravenous    iopamidol (ISOVUE-370) solution 100 mL (Completed) 100 mL ONCE 12/9/2021 12/9/2021    Route: Intravenous          Exam:   Temp:  [97.9  F (36.6  C)] 97.9  F (36.6  C)  Pulse:  [62-65] 65  Resp:  [16] 16  BP: (114)/(75) 114/75  SpO2:  [97 %-99 %] 99 %  Body mass index is 25.39 kg/m .  Temp:  [97.9  F (36.6  C)] 97.9  F (36.6  C)  Pulse:  [62-65] 65  Resp:  [16] 16  BP: (114)/(75) 114/75  SpO2:  [97 %-99 %] 99 %  Appearance: in no apparent distress.   Skin: normal, warm, dry  Head and Neck: Normal, no rashes or jaundice  Respiratory: normal respiratory excursions, no audible wheeze  Cardiovascular: RRR  Abdomen: soft, nontender,  nondistended. No hernia. No incisions  Extremeties: Edema, none  Neuro: without deficit, cranial nerves intact. Does have slight left facial droop at lip.        Diagnostics:   Recent Results (from the past 336 hour(s))   EBV Capsid Antibody IgM    Collection Time: 12/09/21  8:42 AM   Result Value Ref Range    EBV Capsid Nadia IgM Instrument Value <10.0 <36.0 U/mL    EBV Capsid Antibody IgM No detectable antibody. No detectable antibody.   EBV Capsid Antibody IgG    Collection Time: 12/09/21  8:42 AM   Result Value Ref Range    EBV Capsid Nadia IgG Instrument Value 21.1 (H) <18.0 U/mL    EBV Capsid Antibody IgG Equivocal (A) No detectable antibody.   CMV Antibody IgG    Collection Time: 12/09/21  8:42 AM   Result Value Ref Range    CMV Nadia IgG Instrument Value 4.10 (H) <0.60 U/mL    CMV Antibody IgG Positive, suggests recent or past exposure. (A) No detectable antibody.    Prostate spec antigen screen  for men over 50    Collection Time: 12/09/21  8:42 AM   Result Value Ref Range    Prostate Specific Antigen Screen 0.75 0.00 - 4.00 ug/L   Treponema Abs w Reflex to RPR and Titer    Collection Time: 12/09/21  8:42 AM   Result Value Ref Range    Treponema Antibody Total Nonreactive Nonreactive   HIV Antigen Antibody Combo Pretransplant    Collection Time: 12/09/21  8:42 AM   Result Value Ref Range    HIV Antigen Antibody Combo Pretransplant Nonreactive Nonreactive   Hepatitis C antibody    Collection Time: 12/09/21  8:42 AM   Result Value Ref Range    Hepatitis C Antibody Nonreactive Nonreactive   Hepatitis B surface antigen    Collection Time: 12/09/21  8:42 AM   Result Value Ref Range    Hepatitis B Surface Antigen Nonreactive Nonreactive   Hepatitis B Surface Antibody    Collection Time: 12/09/21  8:42 AM   Result Value Ref Range    Hepatitis B Surface Antibody 371.80 (H) <8.00 m[IU]/mL   Hepatitis B core antibody    Collection Time: 12/09/21  8:42 AM   Result Value Ref Range    Hepatitis B Core Antibody Total  Nonreactive Nonreactive   CBC with platelets    Collection Time: 12/09/21  8:42 AM   Result Value Ref Range    WBC Count 6.5 4.0 - 11.0 10e3/uL    RBC Count 5.64 4.40 - 5.90 10e6/uL    Hemoglobin 17.1 13.3 - 17.7 g/dL    Hematocrit 50.3 40.0 - 53.0 %    MCV 89 78 - 100 fL    MCH 30.3 26.5 - 33.0 pg    MCHC 34.0 31.5 - 36.5 g/dL    RDW 12.3 10.0 - 15.0 %    Platelet Count 188 150 - 450 10e3/uL   Partial thromboplastin time    Collection Time: 12/09/21  8:42 AM   Result Value Ref Range    aPTT 32 22 - 38 Seconds   INR    Collection Time: 12/09/21  8:42 AM   Result Value Ref Range    INR 0.96 0.85 - 1.15   Hemoglobin A1c    Collection Time: 12/09/21  8:42 AM   Result Value Ref Range    Hemoglobin A1C 5.5 0.0 - 5.6 %   Phosphorus    Collection Time: 12/09/21  8:42 AM   Result Value Ref Range    Phosphorus 3.3 2.5 - 4.5 mg/dL   Uric acid    Collection Time: 12/09/21  8:42 AM   Result Value Ref Range    Uric Acid 6.4 3.5 - 7.2 mg/dL   Lipid Profile    Collection Time: 12/09/21  8:42 AM   Result Value Ref Range    Cholesterol 179 <200 mg/dL    Triglycerides 118 <150 mg/dL    Direct Measure HDL 72 >=40 mg/dL    LDL Cholesterol Calculated 83 <=100 mg/dL    Non HDL Cholesterol 107 <130 mg/dL    Patient Fasting > 8hrs? Yes    Comprehensive metabolic panel    Collection Time: 12/09/21  8:42 AM   Result Value Ref Range    Sodium 140 133 - 144 mmol/L    Potassium 3.9 3.4 - 5.3 mmol/L    Chloride 106 94 - 109 mmol/L    Carbon Dioxide (CO2) 29 20 - 32 mmol/L    Anion Gap 5 3 - 14 mmol/L    Urea Nitrogen 20 7 - 30 mg/dL    Creatinine 1.01 0.66 - 1.25 mg/dL    Calcium 9.2 8.5 - 10.1 mg/dL    Glucose 93 70 - 99 mg/dL    Alkaline Phosphatase 91 40 - 150 U/L    AST 26 0 - 45 U/L    ALT 39 0 - 70 U/L    Protein Total 8.3 6.8 - 8.8 g/dL    Albumin 4.2 3.4 - 5.0 g/dL    Bilirubin Total 1.6 (H) 0.2 - 1.3 mg/dL    GFR Estimate 78 >60 mL/min/1.73m2   Adult Type and Screen    Collection Time: 12/09/21  8:42 AM   Result Value Ref Range     ABO/RH(D) O POS     Antibody Screen Negative Negative    SPECIMEN EXPIRATION DATE [id number]    Protein  random urine with Creat Ratio    Collection Time: 12/09/21  8:43 AM   Result Value Ref Range    Total Protein Random Urine g/L 0.08 g/L    Total Protein Urine g/gr Creatinine 0.06 0.00 - 0.20 g/g Cr    Creatinine Urine mg/dL 132 mg/dL   Albumin Random Urine Quantitative with Creat Ratio    Collection Time: 12/09/21  8:43 AM   Result Value Ref Range    Creatinine Urine mg/dL 132 mg/dL    Albumin Urine mg/L 5 mg/L    Albumin Urine mg/g Cr 3.79 0.00 - 17.00 mg/g Cr   Routine UA with microscopic    Collection Time: 12/09/21  8:43 AM   Result Value Ref Range    Color Urine Yellow Colorless, Straw, Light Yellow, Yellow    Appearance Urine Clear Clear    Glucose Urine Negative Negative mg/dL    Bilirubin Urine Negative Negative    Ketones Urine Negative Negative mg/dL    Specific Gravity Urine 1.015 1.003 - 1.035    Blood Urine Negative Negative    pH Urine 6.0 5.0 - 7.0    Protein Albumin Urine Negative Negative mg/dL    Urobilinogen Urine Normal Normal, 2.0 mg/dL    Nitrite Urine Negative Negative    Leukocyte Esterase Urine Negative Negative    Mucus Urine Present (A) None Seen /LPF    RBC Urine <1 <=2 /HPF    WBC Urine <1 <=5 /HPF   EKG 12-lead complete w/read - Clinics    Collection Time: 12/09/21  2:57 PM   Result Value Ref Range    Systolic Blood Pressure  mmHg    Diastolic Blood Pressure  mmHg    Ventricular Rate 62 BPM    Atrial Rate 62 BPM    NY Interval 162 ms    QRS Duration 90 ms     ms    QTc 430 ms    P Axis 75 degrees    R AXIS 64 degrees    T Axis 47 degrees    Interpretation ECG       Sinus rhythm  Normal ECG  No previous ECGs available

## 2021-12-09 NOTE — LETTER
12/9/2021         RE: Dennis Gonzales  [address] [address]        Dear Colleague,    Thank you for referring your patient, Dennis Gonzales, to the [facility]. Please see a copy of my visit note below.    Living Kidney Donor Consent per OPTN Policy 14.3.A for Independent Living Donor Advocate (SHERRIE)    Organ Type: Unrelated Kidney Donor (spouse)    Presenting Information:  Dennis Gonzales presents to [facility] Solid Organ Transplant [facility] to complete a living donor evaluation since angel is interested in becoming a kidney donor for his wife, Fatemeh Gonzales.  Mr. GONZALES is a 66 year old  American male who was neatly dressed and groomed.  His mood was euthymic.  Thought process logical and goal directed.  He came to the transplant center alone today.  He was pleasant and forthcoming with sharing information.    Written assurance has been obtained from the potential donor that he/she:   Is willing to donate  Is free from inducement and coercion  Has been informed that the he/she may decline to donate at any time  Has been informed that transplant centers must:   A) Offer donors an opportunity to discontinue the donor consent or evaluation process in a way that is protected and confidential  B) Provide an independent living donor advocate (SHERRIE) to assist the potential donor during this process    The following was presented to the potential donor in a language in which the potential donor is able to engage in meaningful dialogue:   Education and instruction about all phases of the living donation process including:   Consent  Medical and psychosocial evaluation  Information about the surgical procedure  Pre and post operative care  Benefits of post operative follow up  Disclosure that the recovery hospital will take all reasonable precautions to provide confidentiality for the donor/recipient  Disclosure that it is a federal crime for any  person to knowingly acquire, obtain or otherwise transfer any human organ for valuable consideration  Disclosure that the [facility] must provide an independent living donor advocate (SHERRIE)  Disclosure that health information obtained during the evaluation is subject to the same regulations as all records and could reveal conditions that must be reported to local, state, or federal public health authorities  Disclosure that the [facility] is required to report living donor follow up information at 6 months, 1 year, and 2 years, and that the potential donor must commit to post operative follow up testing coordinated by the [facility]    Disclosure has been provided that these risks may be transient or permanent & include but are not limited to:  Potential psychosocial risks:  Problems with body image  Post-surgery depression or anxiety  Feelings of emotional distress or bereavement if recipient experiences any recurrent disease or in the event of the recipient s death  Impact of donation on the donor s lifestyle, such as limited ability to exercise in the short term post operative recovery period, no driving for the first 2 weeks post op or until the donor is no longer needing pain medications that impair the ability to drive.      Potential financial impacts:  Personal expenses of travel, housing, , lost wages related to donation might not be reimbursed. However, resources may be available to defray some donation-related expenses.   Need for life-long follow up at the donor s expense  Loss of employment or income  Negative impact on the ability to obtain future employment  Negative impact on the ability to obtain, maintain, or afford health, disability, and life insurance  Future health problems experienced by living donors following donation may not be covered by the recipient s insurance      PREPARATION FOR DONATION, RECOVERY, AND POTENTIAL SHORT-LONG-TERM OUTCOMES:  Understanding  of the Living Donation Process:  We discussed the role of Independent Living Donor Advocate.  Short and long term medical and psychosocial risks to both, donor and recipient were reviewed and he is capable of understanding the risks.  Post surgical restrictions (2 weeks no driving, 6 weeks no lifting over 10 lbs) were reviewed and he appears capable of adhering to the post surgical requirements. The need for a caregiver was discussed and he was strongly encouraged to ask for help with caregiving.  He has two sons who live out of state and are successful and busy.  I encouraged him to ask for their help post donation .  The risk of poor psychosocial outcome including problems with body image, post-surgery depression or anxiety, or feelings of emotional distress or bereavement if recipient experiences any recurrent disease, poor outcome or death was reviewed.  Additionally, potential financial implications, including the risk of having difficulty obtaining health care insurance, life insurance, disability insurance, or long term care insurance were reviewed, as were available donor grants to assist with donor related expenses.      We also discussed some unique issues that arise with paired kidney donation, which include the uncertainty of the timing and the importance of having a employment situation and support system that is able to provide sustained support and flexibility.    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  He does push up every morning and runs, to stay healthy.  He speaks highly of his sons who live out of state.  I did encourage him to seek out their support at time of donation.    He appears capable of understanding this information and making an informed medical decision.    Impressions/Recommendations:  Mr. Gonzales appears highly motivated to donate a kidney to his wife, Fatemeh GONZALES.  He denies pressure, inducement or coercion.  He is aware that he can chose to decline donation at  any time.  He is retired and has a hard time identifying a support system, but does state his sons are supportive.  As the SHERRIE, I see no contraindication to donation.  If contraindications are identified, those will be considered and followed up on.  He has my contact information and knows that I am available though all phases of the donation process.    Contact Information:  THALIA TURCIOS, JACQUELYN   Independent Donor Advocate  [facility]  Phone - [phone]  Pager - [phone]  [email or web address]      Time Spent: 30 minutes      Again, thank you for allowing me to participate in the care of your patient.        Sincerely,        JACQUELYN Rivera

## 2021-12-09 NOTE — PROGRESS NOTES
Donor Iohexol test    Dennis Gonzales presents today to [facility] for a Donor Iohexol test.      Progress note:  ID verified by name and .     The following information was verified with the patient:  Female Patients is there any possibility of being pregnant n/a  Is there a history of allergy (skin rash, swelling, ect) to:   A.  Iodine (except skin reactions to betadine): No   B. Intravenous radio-contrast agents: No   C. Seafood No     present during visit today: Not Applicable.    R.N. provided patient with educational handout regarding timed test. Yes    24 g piv placed in left ac and Iohexol administered over 2 minutes.  Positive blood return verified before and after injection. piv removed.  20 gauge PIV placed in left arm by lab  for blood draws and CT this afternoon.    Medication administered:  Iohexol (Omnipaque 300mg iodine/ml concentration) 5 mls.    Start time: 908  Stop time: 910    Drug Waste Record    Drug Name: iohexol  Dose: 5ml  Route administered: IV  NDC #: 0407-1413-10  Amount of waste(mL):5ml  Reason for waste: Single use vial    Administrations This Visit     iohexol (OMNIPAQUE 300) injection 5 mL     Admin Date  2021 Action  Given Dose  5 mL Route  Intravenous Administered By  Steffi Pool RN                    Evaluation nurse in transplant to draw labs at 2 and 4 hours post iohexol administration.  Patient given a slip with the times to get labs drawn and verbalized understanding of the plan.    Patient tolerated the procedure:  Yes    After the infusion patient was discharged to the next appointment.    Steffi Pool RN

## 2021-12-09 NOTE — NURSING NOTE
Saw Dennis in clinic on 21 for Living Kidney Donor Evaluation.     Dennis is interested in donation for his wife.    I provided a folder which included copies of the followin. Living Kidney Donor Evaluation Consent  2. Paired Exchange Consent  3. Donor Shield Pamphlet  4. Living Donor Collective Study information  5. Kidney for Life pamphlet  6. Kidney Donors are Heroes! Study synopsis  7. SRTR Data from 21.      I also provided a parking pass.    I reviewed the Living Kidney Donor Evaluation Consent, dated 2020 and Paired Exchange/ NDD consent dated 2018.  I answered any question.    Evaluation Notes:  1. Bili 1.6  2. Per what Dennis said his wife not be able to receive a transplant at this time because she is having cardiac problems that need to be addressed.   3.Tissue typing was already completed and would have to do PEP or advance donation.

## 2021-12-09 NOTE — PROGRESS NOTES
[facility] Solid Organ Transplant  Outpatient MNT: Kidney Donor Evaluation    Current BMI: 25.3 (HT 68 in,  lbs/76 kg)  BMI is within recommendation of <30 for kidney donation    8 Year Estimated Risk of T2DM  </= 3%     Time Spent: 15 minutes  Visit Type: Initial  Referring Physician: Lianna   Pt accompanied by: self     Nutrition Assessment  Vitamins, Supplements, Pertinent Meds: vit D  Herbal Medicines/Supplements: none     Weight hx: stable     Food Security: any concerns about having enough money to buy food or access to grocery stores? No     Diet Recall  Breakfast WW bread w/ PB and nutella, banana   Lunch [employer] bread s/w; take out mostly for this meal    Dinner May go out and get Chinese; at home may have rice, pork/fish, veggies    Snacks Popcorn, occasional candy, oranges    Beverages Tea, water    Alcohol None    Dining out 4-5x/week      Physical Activity  [place]      Labs  Recent Labs   Lab Test 12/09/21  0842   CHOL 179   HDL 72   LDL 83   TRIG 118       FBG = 93  A1c = 5.5  BP = wnl     Prediction of Incident Diabetes Mellitus in Middle-aged Adults: The [place] Offspring Study  Ted Fisher MD; James B. Meigs, MD, MPH; Remedios Cox, PhD; Guillermina Dugan MD, MPH; Gama Sabillon MD; Jong Forbes Sr,   PhD  Pt's estimated risk for T2DM (per Table 6 above)  Pt received points for the following criteria: dad (DM) , BMI>25  Total points: 5  8-Year estimated risk of T2DM: </= 3%    Nutrition Diagnosis  No nutrition diagnosis identified at this time.    Nutrition Intervention  Nutrition education provided:  Reviewed overall healthy diet guidelines for pre and post kidney donation. Discussed maintenance of a healthy weight and Na+ intake <3000 mg/day (<2000 mg/day if HTN).    Avoid the following post op d/t unknown effects on the organs:  - Herbal, Chinese, holistic, chiropractic, natural, alternative medicines and supplements  - Detoxes  and cleanses  - Weight loss pills  - Protein powders or other products with extracts or herbs (ie green tea extract)    Patient Understanding: Pt verbalized understanding of education provided.  Expected Engagement: Good  Follow-Up Plans: PRN     Nutrition Goals  No nutrition goals identified at this time     Carla Cardenas, RD, LD, CCTD

## 2021-12-09 NOTE — NURSING NOTE
Chief Complaint   Patient presents with     Blood Draw     Labs drawn via PIV start by RN. VS taken.     Labs drawn with PIV start by rn.  Pt tolerated well.  VS taken and pt checked in for next appt.    Cesar Vega RN

## 2021-12-09 NOTE — LETTER
Date:December 27, 2021      Patient was self referred, no letter generated. Do not send.        [facility] Information

## 2021-12-09 NOTE — LETTER
12/9/2021     RE: Dennis Gonzales  [address] [address]    Dear Colleague,    Thank you for referring your patient, Dennis Gonzales, to the [facility]. Please see a copy of my visit note below.    67 yo M interested in donation to wife- paired exchange due to antibodies. Understands and wants to proceed with paired exchange if approved. Wife on dialysis twice weekly but otherwise doing well, continuing to work from home for [employer].     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 Active as a runner, runs 5K daily and also walks and does push ups every morning. Uses elliptical during bad weather.     Medical history: prediabetes some years ago, but controlled with diet and exercise. Fatty liver (remote), hasn't drank alcohol for several years    Past head trauma from an accident (very remote). Has a slight left facial droop from this but no other sequelae.     Does have some neck pain- saw physical therapist for this. Not using medication or injection, but has some pain with turning his neck in certain ways. Counseled about neck pain after positioning.     abd soft, flat, no hernia.     Transplant Surgery Consult Note    Medical record number: [id number]  YOB: 1955,   Consult requested by the patient for evaluation of kidney donation candidacy.    Assessment and Recommendations: Mr. Gonzales appears to be a good candidate for kidney donation at this point in the evaluation. The following issues will need to be addressed prior to formal review:    Prediabetes: reportedly diagnosed some years ago, but current A1c 5.5 with diet and increased exercise. Counseled to continue healthy habits.    Elevated total bilirubin/history of fatty liver disease: no evidence of portal HTN or synthetic dysfunction. Will eval liver appearance on CTA. If concerned, can proceed with RUQ US and hepatology consult.       The majority of our  visit today was spent in counseling regarding the medical and surgical risks of kidney donation, the typical timur-and post-operative experience and recovery/return to work pattern.  We also talked about post-op visits and longer term health care maintenance, as well as the implications of having one remaining kidney. This discussion included, but was not limited to rates of complications such as bleeding, infection, need for transfusion, reoperation, other organ injury, future bowel obstruction, incisional hernia, port site pain, varicocele, venous thrombosis, pulmonary embolism, renal failure, and death (3 per 10,000). The patient understands that if end stage renal failure happens that dialysis or transplant would be required. At the conclusion of the visit, all questions had been answered and Mr. Gonzales's candidacy for donation will be reviewed at our Multidisciplinary Donor Selection Committee. He will stay in contact with the nurse coordinator with any concerns.      Total time: 35 minutes  Counselling time: 30 minutes      Mack Dsouza MD  ---------------------------------------------------------------------------------------------------    HPI: Dennis Gonzales is a 66 year old year old male who presents for a kidney donor evaluation.  Patient would like to donate to his wife. They are not compatible for direct donation due to unacceptable antigens and will need to proceed with paired exchange. She is doing well on dialysis twice weekly, and has been able to continue working from home.     He is also in good health. His only medication is a low dose statin. 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 He is active with running and walking 5K a few times per week and using the elliptical.     He does have some neck pain with turning his head for which he has seen a physical therapist. We discussed positioning and the potential strain on his neck, and he is comfortable proceeding.      Personal history of:   No    Yes  Cancer:    [x]      []             Comment:     Diabetes   []      [x]  Comment:  Reported prediabetes but improved to A1c 5.5 with diet and exercise  Thombosis   [x]      []  Comment:       Hepatitis   [x]      []  Comment:    Tuberculosis   [x]      []  Comment:   Back or neck pain:  []      [x]  Comment:  As above   Kidney stones   [x]      []  Comment:                  Kidney infections  [x]      []  Comment:           Urinary retention  [x]      []            Comment:   Regular NSAID use:  [x]      []            Comment:      Constipation:   [x]      []            Comment:      Scientology  [x]      []            Comment:      Other:    [x]      []            Comment:         Past Medical History:   Diagnosis Date     Gilbert's syndrome      High cholesterol      No past surgical history on file.  Family History   Problem Relation Age of Onset     Diabetes Mother      Hypertension Mother      Diabetes Father      Alzheimer Disease Father      Unknown/Adopted Maternal Grandmother      Unknown/Adopted Maternal Grandfather      Unknown/Adopted Paternal Grandmother      Unknown/Adopted Paternal Grandfather      Food Allergy Son      No Known Problems Son      Social History     Socioeconomic History     Marital status:      Spouse name: Not on file     Number of children: Not on file     Years of education: Not on file     Highest education level: Not on file   Occupational History     Not on file   Tobacco Use     Smoking status: Never Smoker     Smokeless tobacco: Never Used   Substance and Sexual Activity     Alcohol use: Not Currently     Drug use: Never     Sexual activity: Yes     Partners: Female   Other Topics Concern     Not on file   Social History Narrative     Not on file     Social Determinants of Health     Financial Resource Strain: Not on file   Food Insecurity: Not on file   Transportation Needs: Not on file   Physical Activity: Not on file    Stress: Not on file   Social Connections: Not on file   Intimate Partner Violence: Not on file   Housing Stability: Not on file       ROS:   CONSTITUTIONAL:  No fevers or chills  EYES: negative for icterus  ENT:  negative for hearing loss, tinnitus and sore throat  RESPIRATORY:  negative for cough, sputum, dyspnea  CARDIOVASCULAR:  negative for chest pain  GASTROINTESTINAL:  negative for nausea, vomiting, diarrhea or constipation  GENITOURINARY:  negative for incontinence, dysuria, bladder emptying problems  HEME:  No easy bruising  INTEGUMENT:  negative for rash and pruritus  NEURO:  Negative for headache, seizure disorder    Allergies:   No Known Allergies    Medications:  Prescription Medications as of 12/9/2021       Rx Number Disp Refills Start End Last Dispensed Date Next Fill Date Owning Pharmacy    atorvastatin (LIPITOR) 10 MG tablet    1/20/2021        Sig: Take 10 mg by mouth    Class: Historical    Route: Oral    calcium-vitamin D (OSCAL) 250-125 MG-UNIT per tablet            Class: Historical    clobetasol (TEMOVATE) 0.05 % external cream    6/3/2019        Class: Historical    Route: Topical      Clinic-Administered Medications as of 12/9/2021       Dose Frequency Start End    CT Sodium Chloride (Completed) 100 mL ONCE 12/9/2021 12/9/2021    Admin Instructions: This entry is for use by Radiology to intermittently used as a flush in patients receiving a CT scan.    Route: Intravenous    iohexol (OMNIPAQUE 300) injection 5 mL (Completed) 5 mL ONCE 12/9/2021 12/9/2021    Route: Intravenous    iopamidol (ISOVUE-370) solution 100 mL (Completed) 100 mL ONCE 12/9/2021 12/9/2021    Route: Intravenous          Exam:   Temp:  [97.9  F (36.6  C)] 97.9  F (36.6  C)  Pulse:  [62-65] 65  Resp:  [16] 16  BP: (114)/(75) 114/75  SpO2:  [97 %-99 %] 99 %  Body mass index is 25.39 kg/m .  Temp:  [97.9  F (36.6  C)] 97.9  F (36.6  C)  Pulse:  [62-65] 65  Resp:  [16] 16  BP: (114)/(75) 114/75  SpO2:  [97 %-99 %] 99  %  Appearance: in no apparent distress.   Skin: normal, warm, dry  Head and Neck: Normal, no rashes or jaundice  Respiratory: normal respiratory excursions, no audible wheeze  Cardiovascular: RRR  Abdomen: soft, nontender, nondistended. No hernia. No incisions  Extremeties: Edema, none  Neuro: without deficit, cranial nerves intact. Does have slight left facial droop at lip.        Diagnostics:   Recent Results (from the past 336 hour(s))   EBV Capsid Antibody IgM    Collection Time: 12/09/21  8:42 AM   Result Value Ref Range    EBV Capsid Nadia IgM Instrument Value <10.0 <36.0 U/mL    EBV Capsid Antibody IgM No detectable antibody. No detectable antibody.   EBV Capsid Antibody IgG    Collection Time: 12/09/21  8:42 AM   Result Value Ref Range    EBV Capsid Nadia IgG Instrument Value 21.1 (H) <18.0 U/mL    EBV Capsid Antibody IgG Equivocal (A) No detectable antibody.   CMV Antibody IgG    Collection Time: 12/09/21  8:42 AM   Result Value Ref Range    CMV Nadia IgG Instrument Value 4.10 (H) <0.60 U/mL    CMV Antibody IgG Positive, suggests recent or past exposure. (A) No detectable antibody.    Prostate spec antigen screen  for men over 50    Collection Time: 12/09/21  8:42 AM   Result Value Ref Range    Prostate Specific Antigen Screen 0.75 0.00 - 4.00 ug/L   Treponema Abs w Reflex to RPR and Titer    Collection Time: 12/09/21  8:42 AM   Result Value Ref Range    Treponema Antibody Total Nonreactive Nonreactive   HIV Antigen Antibody Combo Pretransplant    Collection Time: 12/09/21  8:42 AM   Result Value Ref Range    HIV Antigen Antibody Combo Pretransplant Nonreactive Nonreactive   Hepatitis C antibody    Collection Time: 12/09/21  8:42 AM   Result Value Ref Range    Hepatitis C Antibody Nonreactive Nonreactive   Hepatitis B surface antigen    Collection Time: 12/09/21  8:42 AM   Result Value Ref Range    Hepatitis B Surface Antigen Nonreactive Nonreactive   Hepatitis B Surface Antibody    Collection Time: 12/09/21   8:42 AM   Result Value Ref Range    Hepatitis B Surface Antibody 371.80 (H) <8.00 m[IU]/mL   Hepatitis B core antibody    Collection Time: 12/09/21  8:42 AM   Result Value Ref Range    Hepatitis B Core Antibody Total Nonreactive Nonreactive   CBC with platelets    Collection Time: 12/09/21  8:42 AM   Result Value Ref Range    WBC Count 6.5 4.0 - 11.0 10e3/uL    RBC Count 5.64 4.40 - 5.90 10e6/uL    Hemoglobin 17.1 13.3 - 17.7 g/dL    Hematocrit 50.3 40.0 - 53.0 %    MCV 89 78 - 100 fL    MCH 30.3 26.5 - 33.0 pg    MCHC 34.0 31.5 - 36.5 g/dL    RDW 12.3 10.0 - 15.0 %    Platelet Count 188 150 - 450 10e3/uL   Partial thromboplastin time    Collection Time: 12/09/21  8:42 AM   Result Value Ref Range    aPTT 32 22 - 38 Seconds   INR    Collection Time: 12/09/21  8:42 AM   Result Value Ref Range    INR 0.96 0.85 - 1.15   Hemoglobin A1c    Collection Time: 12/09/21  8:42 AM   Result Value Ref Range    Hemoglobin A1C 5.5 0.0 - 5.6 %   Phosphorus    Collection Time: 12/09/21  8:42 AM   Result Value Ref Range    Phosphorus 3.3 2.5 - 4.5 mg/dL   Uric acid    Collection Time: 12/09/21  8:42 AM   Result Value Ref Range    Uric Acid 6.4 3.5 - 7.2 mg/dL   Lipid Profile    Collection Time: 12/09/21  8:42 AM   Result Value Ref Range    Cholesterol 179 <200 mg/dL    Triglycerides 118 <150 mg/dL    Direct Measure HDL 72 >=40 mg/dL    LDL Cholesterol Calculated 83 <=100 mg/dL    Non HDL Cholesterol 107 <130 mg/dL    Patient Fasting > 8hrs? Yes    Comprehensive metabolic panel    Collection Time: 12/09/21  8:42 AM   Result Value Ref Range    Sodium 140 133 - 144 mmol/L    Potassium 3.9 3.4 - 5.3 mmol/L    Chloride 106 94 - 109 mmol/L    Carbon Dioxide (CO2) 29 20 - 32 mmol/L    Anion Gap 5 3 - 14 mmol/L    Urea Nitrogen 20 7 - 30 mg/dL    Creatinine 1.01 0.66 - 1.25 mg/dL    Calcium 9.2 8.5 - 10.1 mg/dL    Glucose 93 70 - 99 mg/dL    Alkaline Phosphatase 91 40 - 150 U/L    AST 26 0 - 45 U/L    ALT 39 0 - 70 U/L    Protein Total 8.3 6.8  - 8.8 g/dL    Albumin 4.2 3.4 - 5.0 g/dL    Bilirubin Total 1.6 (H) 0.2 - 1.3 mg/dL    GFR Estimate 78 >60 mL/min/1.73m2   Adult Type and Screen    Collection Time: 12/09/21  8:42 AM   Result Value Ref Range    ABO/RH(D) O POS     Antibody Screen Negative Negative    SPECIMEN EXPIRATION DATE [id number]    Protein  random urine with Creat Ratio    Collection Time: 12/09/21  8:43 AM   Result Value Ref Range    Total Protein Random Urine g/L 0.08 g/L    Total Protein Urine g/gr Creatinine 0.06 0.00 - 0.20 g/g Cr    Creatinine Urine mg/dL 132 mg/dL   Albumin Random Urine Quantitative with Creat Ratio    Collection Time: 12/09/21  8:43 AM   Result Value Ref Range    Creatinine Urine mg/dL 132 mg/dL    Albumin Urine mg/L 5 mg/L    Albumin Urine mg/g Cr 3.79 0.00 - 17.00 mg/g Cr   Routine UA with microscopic    Collection Time: 12/09/21  8:43 AM   Result Value Ref Range    Color Urine Yellow Colorless, Straw, Light Yellow, Yellow    Appearance Urine Clear Clear    Glucose Urine Negative Negative mg/dL    Bilirubin Urine Negative Negative    Ketones Urine Negative Negative mg/dL    Specific Gravity Urine 1.015 1.003 - 1.035    Blood Urine Negative Negative    pH Urine 6.0 5.0 - 7.0    Protein Albumin Urine Negative Negative mg/dL    Urobilinogen Urine Normal Normal, 2.0 mg/dL    Nitrite Urine Negative Negative    Leukocyte Esterase Urine Negative Negative    Mucus Urine Present (A) None Seen /LPF    RBC Urine <1 <=2 /HPF    WBC Urine <1 <=5 /HPF   EKG 12-lead complete w/read - Clinics    Collection Time: 12/09/21  2:57 PM   Result Value Ref Range    Systolic Blood Pressure  mmHg    Diastolic Blood Pressure  mmHg    Ventricular Rate 62 BPM    Atrial Rate 62 BPM    NM Interval 162 ms    QRS Duration 90 ms     ms    QTc 430 ms    P Axis 75 degrees    R AXIS 64 degrees    T Axis 47 degrees    Interpretation ECG       Sinus rhythm  Normal ECG  No previous ECGs available       Again, thank you for allowing me to  participate in the care of your patient.      Sincerely,    Mack Dsouza MD

## 2021-12-10 LAB
GAMMA INTERFERON BACKGROUND BLD IA-ACNC: 0.44 IU/ML
M TB IFN-G BLD-IMP: POSITIVE
M TB IFN-G CD4+ BCKGRND COR BLD-ACNC: 9.56 IU/ML
MITOGEN IGNF BCKGRD COR BLD-ACNC: 9.23 IU/ML
MITOGEN IGNF BCKGRD COR BLD-ACNC: 9.56 IU/ML
QUANTIFERON MITOGEN: 10 IU/ML
QUANTIFERON NIL TUBE: 0.44 IU/ML
QUANTIFERON TB1 TUBE: 9.67 IU/ML
QUANTIFERON TB2 TUBE: 10

## 2021-12-11 LAB
WNV IGG SER IA-ACNC: 0.49 IV
WNV IGM SER IA-ACNC: 0 IV

## 2021-12-12 LAB — STRONGYLOIDES IGG SER IA-ACNC: 0.1 IV

## 2021-12-13 LAB — TRYPANOSOMA CRUZI: NORMAL

## 2021-12-14 ENCOUNTER — DOCUMENTATION ONLY (OUTPATIENT)
Dept: TRANSPLANT | Facility: CLINIC | Age: 66
End: 2021-12-14
Payer: COMMERCIAL

## 2021-12-14 NOTE — PROGRESS NOTES
Image Review Meeting    ATTENDEES: Mack SKELTON, Sylvia Lainez, Yamilex White, Darshana Dugan, April Macias, Jaelyn Denton, Edith Guerra    IMAGES REVIEWED: CTA renal from 12/9/21    Impression:      1.   1a. The right kidney contains 1 artery, 1 vein, and 1 ureter.  1b. The right kidney parenchyma demonstrates hypoattenuating foci  which are too small to characterize though likely simple renal cysts.  The renal volume is 185.     2.   2a. The left kidney contains 1 artery, 1 vein, and 1 ureter.  2b. The left kidney parenchyma demonstrates hypoattenuating foci which  are too small to characterize though likely simple renal cysts. The  renal volume is 185.5 cc.    DECISION: LEFT OR CHOICE-FYI there is plaque on the aortic left artery so surgeon needs to be careful when stapling off.    INCIDENTALS: No

## 2021-12-15 ENCOUNTER — COMMITTEE REVIEW (OUTPATIENT)
Dept: TRANSPLANT | Facility: CLINIC | Age: 66
End: 2021-12-15
Payer: COMMERCIAL

## 2021-12-15 ENCOUNTER — TELEPHONE (OUTPATIENT)
Dept: TRANSPLANT | Facility: CLINIC | Age: 66
End: 2021-12-15
Payer: COMMERCIAL

## 2021-12-15 DIAGNOSIS — Z00.5 TRANSPLANT DONOR EVALUATION: Primary | ICD-10-CM

## 2021-12-15 NOTE — TELEPHONE ENCOUNTER
Spoke to José Miguel regarding committee review results:    1. Will need to see ID. Had TB in the 10th grade and did treatment. He attempted to get records but they were destroyed since it has been over 10 years per José Miguel.  2. Will need to do PEP when recip is ready  3. Up to date with PSA and colonoscopy

## 2021-12-15 NOTE — COMMITTEE REVIEW
Living Donor Committee Review Note Evaluation Date: 12/9/2021  Committee Review Date: 12/15/2021    Donor being evaluated for: Kidney    Transplant Phase: Evaluation  Transplant Status: Active    Transplant Coordinator: Jaelyn East  Transplant Surgeon:       Committee Review Members:  Immunology Gama Mariee, PhD   Nephrology Jad Galo MD, Delano Patterson MD, Jose Leyva MD   Nutrition Carla Cardenas,    Pharmacist Lavonne Godinez, [facility]    - Clinical Yumiko East, [facility], Fina Dougherty, [facility]   Transplant Beverly Angi Guerra, RN, Ervin Ceron, RN, Georgie Blackman LPN, Julianne Wright, RN, Sylvia Lainez, RN, Jaelyn East, RN   Transplant Surgery Iwona Talbot MD, MD       Transplant Eligibility: Acceptable Mental Health, Acceptable Physical Health    Committee Review Decision: Needs Re-presentation    Relative Contraindications: None    Absolute Contraindications: None    Committee Chair Iwona Talbot MD verbally attested to the committee's decision.    Committee Discussion Details:   1. Will need to see ID. Had TB in the 10th grade and did treatment. He attempted to get records but they were destroyed since it has been over 10 years per José Miguel.

## 2021-12-16 LAB
BSA: 1.92 M2
IOHEXOL CL UR+SERPL-VRATE: 4.79 MG/DL
IOHEXOL CL UR+SERPL-VRATE: 8.49 MG/DL
IOHEXOL CL UR+SERPL-VRATE: 80 /1.73 M2
IOHEXOL CL UR+SERPL-VRATE: 89 ML/MIN

## 2021-12-21 ENCOUNTER — TELEPHONE (OUTPATIENT)
Dept: INFECTIOUS DISEASES | Facility: CLINIC | Age: 66
End: 2021-12-21
Payer: COMMERCIAL

## 2022-01-02 NOTE — TELEPHONE ENCOUNTER
RECORDS RECEIVED FROM: Internal   DATE RECEIVED: 1.26.22   NOTES (Gather within 2 years) STATUS DETAILS   OFFICE NOTE from referring provider   Internal 12.15.21 MANJU Galo   OFFICE NOTE from other specialist N/A    DISCHARGE SUMMARY from hospital N/A    DISCHARGE REPORT from the ER N/A    LABS (any labs) Internal    MEDICATION LIST Internal    IMAGING  (NEED IMAGES AND REPORTS)     Osteomyelitis: Foot imaging  N/A    Liver Abscess: Abdominal imaging N/A    Other (anything related to diagnoses N/A

## 2022-01-24 ENCOUNTER — DOCUMENTATION ONLY (OUTPATIENT)
Dept: TRANSPLANT | Facility: CLINIC | Age: 67
End: 2022-01-24
Payer: COMMERCIAL

## 2022-01-26 ENCOUNTER — PRE VISIT (OUTPATIENT)
Dept: INFECTIOUS DISEASES | Facility: CLINIC | Age: 67
End: 2022-01-26
Payer: COMMERCIAL

## 2022-01-26 ENCOUNTER — OFFICE VISIT (OUTPATIENT)
Dept: INFECTIOUS DISEASES | Facility: CLINIC | Age: 67
End: 2022-01-26
Attending: INTERNAL MEDICINE

## 2022-01-26 VITALS
HEART RATE: 77 BPM | DIASTOLIC BLOOD PRESSURE: 75 MMHG | SYSTOLIC BLOOD PRESSURE: 113 MMHG | TEMPERATURE: 97.8 F | BODY MASS INDEX: 26.09 KG/M2 | WEIGHT: 171.6 LBS | OXYGEN SATURATION: 100 %

## 2022-01-26 DIAGNOSIS — Z00.5 TRANSPLANT DONOR EVALUATION: Primary | ICD-10-CM

## 2022-01-26 PROCEDURE — G0463 HOSPITAL OUTPT CLINIC VISIT: HCPCS

## 2022-01-26 PROCEDURE — 99205 OFFICE O/P NEW HI 60 MIN: CPT | Performed by: INTERNAL MEDICINE

## 2022-01-26 ASSESSMENT — PAIN SCALES - GENERAL: PAINLEVEL: NO PAIN (0)

## 2022-01-26 NOTE — PROGRESS NOTES
ID Clinic initial visit in-person     Assessment:  1. 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 Based on his verbal report of details, he completed a course of therapy, likely INH based on practice patterns during that time period.  2. Ongoing IGRA positivity, most likely representing prior LTBI (and not representative of a new, second episode of LTBI)  3. Extensive travel history, including to Strongy endemic areas. Strongy serology negative.    Discussion:  We discussed that in most cases IGRA positivity related to LTBI is life-long irrespective of whether the LTBI has been treated. In his case, I am quite confident that his initial LTBI diagnosis in 1973 was managed appropriately.  The downfall of LTBI testing is that it cannot distinguish between past LTBI that has been treated and a situation in which LTBI was treated, but the patient was re-exposed. This patient does have a relatively broad travel history, but it is particularly reassuring that his wife and travel  has has a negative LTBI w/u, and that none of his other family members have a history of positive LTBI testing (including a son who is employed in health care).    Plan:  - no further testing or treatment needed at this time     It is a pleasure to participate in Dennis's care. Total visit length 60 min, >50% clinical counseling/care coordination.    Katelynn Yang MD   of Medicine, Division of Infectious Diseases  [facility] [phone]    HPI:    This is a pleasant 66-year-old man who is being considered for living donor kidney evaluation.  Intended recipient is his wife via paired exchange.  He has underlying history of hyperlipidemia.  His donor work-up has revealed positive QuantiFERON gold testing.  He is referred to infectious diseases for further evaluation.    The patient was first noted to have positive TB testing in 1973.  He reports this testing was done at his high school in  [place], [place].  He reports that it was a tuberculin skin test.  After a positive result he underwent chest x-ray and symptom screening, both of which were negative.  He reports that he took 1 year of one medication, and underwent monthly chest radiographs.  His care was coordinated through the [facility] clinic.  He has not undergone subsequent testing for latent TB.    He emigrated with his family to the [place] from [place] in 1968, at age 12.  He does not specifically recall having been TB tested at that time.  He does not have precise details on TB testing in his family, including his parents and 5 siblings.  He has not been told that any of his family has had prior positive latent TB tests.  He is aware of 1 brother who underwent TST at the [employer] [place], [place] [employer] in 1973, and his brother had negative TST.    He reports no known contact with persons who have had active TB.  He has traveled extensively throughout [place] including [place], [place], [place], [place], [place].  He has traveled to [place].  He is taken cruise in [place].  He is travel to [place].  He has not worked in healthcare or corrections either outside or inside the [place].  His wife accompanied him on his travels and her latent TB testing has been negative.    Patient Active Problem List   Diagnosis     Transplant donor evaluation     Past Medical History:   Diagnosis Date     Gilbert's syndrome      High cholesterol      Current Outpatient Medications   Medication     atorvastatin (LIPITOR) 10 MG tablet     calcium-vitamin D (OSCAL) 250-125 MG-UNIT per tablet     clobetasol (TEMOVATE) 0.05 % external cream     No current facility-administered medications for this visit.      No Known Allergies    Social History     Tobacco Use     Smoking status: Never Smoker     Smokeless tobacco: Never Used   Substance Use Topics     Alcohol use: Not Currently     Drug use: Never     Exam:  BP  113/75   Pulse 77   Temp 97.8  F (36.6  C) (Oral)   Wt 77.8 kg (171 lb 9.6 oz)   SpO2 100%   BMI 26.09 kg/m    GENERAL: Healthy, alert and no distress  EYES: Eyes grossly normal to inspection.  No discharge or erythema, or obvious scleral/conjunctival abnormalities.  RESP: No audible wheeze, cough, or visible cyanosis.  No visible retractions or increased work of breathing.    SKIN: Visible skin clear. No significant rash, abnormal pigmentation or lesions.  NEURO: Cranial nerves grossly intact.  Mentation and speech appropriate for age.  PSYCH: Mentation appears normal, affect normal/bright, judgement and insight intact, normal speech and appearance well-groomed.

## 2022-01-26 NOTE — NURSING NOTE
Chief Complaint   Patient presents with     Consult     kidey donor     /75   Pulse 77   Temp 97.8  F (36.6  C) (Oral)   Wt 77.8 kg (171 lb 9.6 oz)   SpO2 100%   BMI 26.09 kg/m    Oli Alvarado MA on 1/26/2022 at 9:51 AM

## 2022-01-26 NOTE — LETTER
1/26/2022       RE: Dennis Gonzales  [address] [address]     Dear Colleague,    Thank you for referring your patient, Dennis Gonzales, to the [facility] INFECTIOUS DISE[facility]INIC [facility] [facility]. Please see a copy of my visit note below.    ID Clinic initial visit in-person     Assessment:  1. 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 Based on his verbal report of details, he completed a course of therapy, likely INH based on practice patterns during that time period.  2. Ongoing IGRA positivity, most likely representing prior LTBI (and not representative of a new, second episode of LTBI)  3. Extensive travel history, including to Strongy endemic areas. Strongy serology negative.    Discussion:  We discussed that in most cases IGRA positivity related to LTBI is life-long irrespective of whether the LTBI has been treated. In his case, I am quite confident that his initial LTBI diagnosis in 1973 was managed appropriately.  The downfall of LTBI testing is that it cannot distinguish between past LTBI that has been treated and a situation in which LTBI was treated, but the patient was re-exposed. This patient does have a relatively broad travel history, but it is particularly reassuring that his wife and travel  has has a negative LTBI w/u, and that none of his other family members have a history of positive LTBI testing (including a son who is employed in health care).    Plan:  - no further testing or treatment needed at this time     It is a pleasure to participate in Dennis's care. Total visit length 60 min, >50% clinical counseling/care coordination.    Katelynn Yang MD   of Medicine, Division of Infectious Diseases  [facility] [phone]    HPI:    This is a pleasant 66-year-old man who is being considered for living donor kidney evaluation.  Intended recipient is his wife via  paired exchange.  He has underlying history of hyperlipidemia.  His donor work-up has revealed positive QuantiFERON gold testing.  He is referred to infectious diseases for further evaluation.    The patient was first noted to have positive TB testing in 1973.  He reports this testing was done at his high school in [place], [place].  He reports that it was a tuberculin skin test.  After a positive result he underwent chest x-ray and symptom screening, both of which were negative.  He reports that he took 1 year of one medication, and underwent monthly chest radiographs.  His care was coordinated through the [facility] TB clinic.  He has not undergone subsequent testing for latent TB.    He emigrated [place] his [place] from [place] in 1968, at age 12.  He does not specifically recall having been TB tested at that time.  He does not have precise details on TB testing in his family, including his parents and 5 siblings.  He has not been told that any of his family has had prior positive latent TB tests.  He is aware of 1 brother who underwent TST at the [employer] [place] [place] [employer] in 1973, and his brother had negative TST.    He reports no known contact with persons who have had active TB.  He has traveled extensively throughout [place] including [place], [place], [place], [place], [place].  He has traveled to [place].  He is taken cruise in [place].  He is travel to [place].  He has not worked in healthcare or corrections either outside or inside the [place].  His wife accompanied him on his travels and her latent TB testing has been negative.    Patient Active Problem List   Diagnosis     Transplant donor evaluation     Past Medical History:   Diagnosis Date     Gilbert's syndrome      High cholesterol      Current Outpatient Medications   Medication     atorvastatin (LIPITOR) 10 MG tablet     calcium-vitamin D (OSCAL) 250-125 MG-UNIT per tablet     clobetasol  (TEMOVATE) 0.05 % external cream     No current facility-administered medications for this visit.      No Known Allergies    Social History     Tobacco Use     Smoking status: Never Smoker     Smokeless tobacco: Never Used   Substance Use Topics     Alcohol use: Not Currently     Drug use: Never     Exam:  /75   Pulse 77   Temp 97.8  F (36.6  C) (Oral)   Wt 77.8 kg (171 lb 9.6 oz)   SpO2 100%   BMI 26.09 kg/m    GENERAL: Healthy, alert and no distress  EYES: Eyes grossly normal to inspection.  No discharge or erythema, or obvious scleral/conjunctival abnormalities.  RESP: No audible wheeze, cough, or visible cyanosis.  No visible retractions or increased work of breathing.    SKIN: Visible skin clear. No significant rash, abnormal pigmentation or lesions.  NEURO: Cranial nerves grossly intact.  Mentation and speech appropriate for age.  PSYCH: Mentation appears normal, affect normal/bright, judgement and insight intact, normal speech and appearance well-groomed.

## 2022-01-27 RX ORDER — BETAMETHASONE DIPROPIONATE 0.5 MG/G
CREAM TOPICAL
COMMUNITY

## 2022-02-02 ENCOUNTER — DOCUMENTATION ONLY (OUTPATIENT)
Dept: TRANSPLANT | Facility: CLINIC | Age: 67
End: 2022-02-02
Payer: COMMERCIAL

## 2022-02-02 ENCOUNTER — COMMITTEE REVIEW (OUTPATIENT)
Dept: TRANSPLANT | Facility: CLINIC | Age: 67
End: 2022-02-02
Payer: COMMERCIAL

## 2022-02-02 ENCOUNTER — TELEPHONE (OUTPATIENT)
Dept: TRANSPLANT | Facility: CLINIC | Age: 67
End: 2022-02-02
Payer: COMMERCIAL

## 2022-02-02 NOTE — PHARMACY-MEDICATION REGIMEN REVIEW
Pharmacy Living Kidney Donor Medication Evaluation     This patient is a 66 year old male being considered for living kidney donation. As part of the kidney pre-donation patient evaluation, pharmacy has screened this patient's electronic medical record for medication related concerns.    Assessment / Plan    No significant potential medication related issues are expected for this patient post surgery, based on the medical record medication list review.  Pharmacy will continue to participate in this patient's care throughout the surgery course.  Please contact pharmacy with any further medication related questions or concerns.         Pratik Morgan, Pharm.D.  [facility]  [phone]

## 2022-02-02 NOTE — TELEPHONE ENCOUNTER
Spoke with José Miguel and let him know he was approved and he understands it will be PEP. We are in a holding period right now until his wife (issac) finishes her testing and things she needs to follow up with. Will reach out when she is ready to go.

## 2022-02-02 NOTE — COMMITTEE REVIEW
Living Donor Committee Review Note Evaluation Date: 12/9/2021  Committee Review Date: 2/2/2022    Donor being evaluated for: Kidney    Transplant Phase: Evaluation  Transplant Status: Active    Transplant Coordinator: Jaelyn Pike  Transplant Surgeon:       Committee Review Members:  Immunology Gama Mariee, PhD, Jacinda Tejada   Nephrology Jad Galo MD, Lexus Larry MD, Delano Patterson MD   Nutrition Carla Cardenas,    Pharmacist Pratik Morgan, [facility], Lavonne Godinez, [facility]    - Clinical Yumiko East, [facility], Fina Dougherty, [facility]   Transplant Beverly Angi Guerra, OLLIE, Lynda Gregory, NP, Ervin Ceron, RN, Georgie Blackman LPN, Julianne Wright, OLLIE, Sylvia Lainez, OLLIE, Jaelyn Golden, RN   Transplant Surgery Atif Fam MD       Transplant Eligibility: Acceptable Physical Health, Acceptable Mental Health    Committee Review Decision: Approved    Relative Contraindications: None    Absolute Contraindications: None    Committee Chair Atif Fam MD verbally attested to the committee's decision.    Committee Discussion Details:   1. ID consult reviewed and acceptable

## 2022-09-25 ENCOUNTER — HEALTH MAINTENANCE LETTER (OUTPATIENT)
Age: 67
End: 2022-09-25

## 2023-01-30 ENCOUNTER — HEALTH MAINTENANCE LETTER (OUTPATIENT)
Age: 68
End: 2023-01-30

## 2024-03-02 ENCOUNTER — HEALTH MAINTENANCE LETTER (OUTPATIENT)
Age: 69
End: 2024-03-02
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
## 2024-04-25 ENCOUNTER — OFFICE VISIT (OUTPATIENT)
Dept: AUDIOLOGY | Facility: CLINIC | Age: 69
End: 2024-04-25
Payer: COMMERCIAL

## 2024-04-25 DIAGNOSIS — H90.3 SNHL (SENSORY-NEURAL HEARING LOSS), ASYMMETRICAL: Primary | ICD-10-CM

## 2024-04-25 PROCEDURE — 92565 STENGER TEST PURE TONE: CPT | Performed by: AUDIOLOGIST

## 2024-04-25 PROCEDURE — 92557 COMPREHENSIVE HEARING TEST: CPT | Performed by: AUDIOLOGIST

## 2024-04-25 PROCEDURE — 92567 TYMPANOMETRY: CPT | Performed by: AUDIOLOGIST

## 2024-06-03 NOTE — PROGRESS NOTES
"AUDIOLOGY REPORT    SUBJECTIVE: Dennis Gonzales is a 68 year old male was seen in the Audiology Clinic at  [facility] on 6/06/24 to discuss concerns with hearing and functional communication difficulties. Dennis has been seen previously on 04/25/2024, and results revealed normal hearing 250-500 Hz sloping to moderately-severe right and severe left sensorineural hearing loss. Dennis notes difficulty with communication in a variety of listening situations.    OBJECTIVE:  We first discussed José Miguel's hearing aid insurance coverage. His insurance company has noted to our clinic representative that we are out of network for the plan and require an insurance referral to determine whether coverage can be obtained at this clinic. However, when José Miguel called the insurance, he was told that our clinic is in network for hearing aids and that aids would be covered at a \"standard\" level with the patient being responsible for any upgrade charge. José Miguel decided that he would like to proceed with today's appointment, understanding that if we are indeed out of network, there will be a $125 charge for today for which he will be responsible.    Patient is a hearing aid candidate. Patient would like to move forward with a hearing aid evaluation today. Therefore, the patient was presented with different options for amplification to help aid in communication. Discussed styles, levels of technology and monaural vs. binaural fitting.     The hearing aid(s) mutually chosen were:  Binaural: O[employer]on Intent 1 miniRITE  COLOR: blue  BATTERY SIZE: rechargeable  EARMOLD/TIPS: stock domes to be chosen at fitting   LENGTH: to be chosen at fitting    ASSESSMENT:   Hearing aid selection today for patient with bilateral sensorineural hearing loss.    Reviewed purchase information and warranty information with patient. The 45 day trial period was explained to patient. The patient was given a copy of the [place] Department [employer] " [employer] consumer brochure on purchasing hearing instruments. Patient risk factors have been provided to the patient in writing prior to the sale of the hearing aid per FDA regulation. The risk factors are also available in the User Instructional Booklet to be presented on the day of the hearing aid fitting. Hearing aid(s) ordered. Hearing aid evaluation completed.    PLAN: José Miguel is scheduled to return in 2-3 weeks for a hearing aid fitting and programming. He will verify his insurance coverage before that time. Purchase agreement will be completed on that date. Please contact this clinic with any questions or concerns.      Citlali Laureano, [facility]-A  [place] Licensed Audiologist #[id number]

## 2024-06-06 ENCOUNTER — OFFICE VISIT (OUTPATIENT)
Dept: AUDIOLOGY | Facility: CLINIC | Age: 69
End: 2024-06-06
Payer: COMMERCIAL

## 2024-06-06 DIAGNOSIS — H90.3 SNHL (SENSORY-NEURAL HEARING LOSS), ASYMMETRICAL: Primary | ICD-10-CM

## 2024-06-06 PROCEDURE — 92591 PR HEARING AID EXAM BINAURAL: CPT | Performed by: AUDIOLOGIST

## 2024-10-09 ENCOUNTER — LAB REQUISITION (OUTPATIENT)
Dept: LAB | Facility: CLINIC | Age: 69
End: 2024-10-09

## 2024-10-09 DIAGNOSIS — Z12.5 ENCOUNTER FOR SCREENING FOR MALIGNANT NEOPLASM OF PROSTATE: ICD-10-CM

## 2024-10-09 DIAGNOSIS — Z13.228 ENCOUNTER FOR SCREENING FOR OTHER METABOLIC DISORDERS: ICD-10-CM

## 2024-10-09 LAB
ALBUMIN SERPL BCG-MCNC: 4.3 G/DL (ref 3.5–5.2)
ALP SERPL-CCNC: 88 U/L (ref 40–150)
ALT SERPL W P-5'-P-CCNC: 31 U/L (ref 0–70)
ANION GAP SERPL CALCULATED.3IONS-SCNC: 12 MMOL/L (ref 7–15)
AST SERPL W P-5'-P-CCNC: 28 U/L (ref 0–45)
BILIRUB SERPL-MCNC: 1.3 MG/DL
BUN SERPL-MCNC: 16.1 MG/DL (ref 8–23)
CALCIUM SERPL-MCNC: 9.4 MG/DL (ref 8.8–10.4)
CHLORIDE SERPL-SCNC: 103 MMOL/L (ref 98–107)
CHOLEST SERPL-MCNC: 156 MG/DL
CREAT SERPL-MCNC: 0.93 MG/DL (ref 0.67–1.17)
EGFRCR SERPLBLD CKD-EPI 2021: 89 ML/MIN/1.73M2
FASTING STATUS PATIENT QL REPORTED: ABNORMAL
FASTING STATUS PATIENT QL REPORTED: NORMAL
GLUCOSE SERPL-MCNC: 96 MG/DL (ref 70–99)
HCO3 SERPL-SCNC: 27 MMOL/L (ref 22–29)
HDLC SERPL-MCNC: 56 MG/DL
LDLC SERPL CALC-MCNC: 73 MG/DL
NONHDLC SERPL-MCNC: 100 MG/DL
POTASSIUM SERPL-SCNC: 4 MMOL/L (ref 3.4–5.3)
PROT SERPL-MCNC: 7.4 G/DL (ref 6.4–8.3)
PSA SERPL DL<=0.01 NG/ML-MCNC: 3.51 NG/ML (ref 0–4.5)
SODIUM SERPL-SCNC: 142 MMOL/L (ref 135–145)
TRIGL SERPL-MCNC: 133 MG/DL

## 2024-10-09 PROCEDURE — 80053 COMPREHEN METABOLIC PANEL: CPT | Performed by: STUDENT IN AN ORGANIZED HEALTH CARE EDUCATION/TRAINING PROGRAM

## 2024-10-09 PROCEDURE — 80061 LIPID PANEL: CPT | Performed by: STUDENT IN AN ORGANIZED HEALTH CARE EDUCATION/TRAINING PROGRAM

## 2024-10-09 PROCEDURE — G0103 PSA SCREENING: HCPCS | Performed by: STUDENT IN AN ORGANIZED HEALTH CARE EDUCATION/TRAINING PROGRAM

## 2025-03-15 ENCOUNTER — HEALTH MAINTENANCE LETTER (OUTPATIENT)
Age: 70
End: 2025-03-15